# Patient Record
Sex: FEMALE | Race: WHITE | NOT HISPANIC OR LATINO | Employment: OTHER | URBAN - METROPOLITAN AREA
[De-identification: names, ages, dates, MRNs, and addresses within clinical notes are randomized per-mention and may not be internally consistent; named-entity substitution may affect disease eponyms.]

---

## 2017-08-01 ENCOUNTER — APPOINTMENT (OUTPATIENT)
Dept: NEUROLOGY | Facility: MEDICAL CENTER | Age: 67
End: 2017-08-01
Payer: MEDICARE

## 2017-08-01 DIAGNOSIS — G35 MS (MULTIPLE SCLEROSIS) (HCC): ICD-10-CM

## 2017-08-01 RX ORDER — GLATIRAMER ACETATE 40 MG/ML
1 INJECTION, SOLUTION SUBCUTANEOUS
Qty: 12 SYRINGE | Refills: 11 | Status: SHIPPED | OUTPATIENT
Start: 2017-08-02

## 2017-08-01 NOTE — TELEPHONE ENCOUNTER
Was the patient seen in the last year in this department? No     Does patient have an active prescription for medications requested? No     Received Request Via: Patient       Pt and her son called and left  stating that she had court today and could not make it to her appointment. She states she is back on Chemo now but would like to continue her Copaxone again. She states she will make a f/v for your next available appt. But would like to know if you can prescribe this to her.    Please advise    Thanks Batsheva MI

## 2018-01-08 ENCOUNTER — OFFICE VISIT (OUTPATIENT)
Dept: NEUROLOGY | Facility: MEDICAL CENTER | Age: 68
End: 2018-01-08
Payer: MEDICARE

## 2018-01-08 VITALS
BODY MASS INDEX: 15.86 KG/M2 | WEIGHT: 84 LBS | OXYGEN SATURATION: 95 % | SYSTOLIC BLOOD PRESSURE: 142 MMHG | HEIGHT: 61 IN | HEART RATE: 75 BPM | DIASTOLIC BLOOD PRESSURE: 82 MMHG | TEMPERATURE: 98.8 F

## 2018-01-08 DIAGNOSIS — G35 MS (MULTIPLE SCLEROSIS) (HCC): ICD-10-CM

## 2018-01-08 PROCEDURE — 99214 OFFICE O/P EST MOD 30 MIN: CPT | Performed by: PSYCHIATRY & NEUROLOGY

## 2018-01-08 NOTE — ASSESSMENT & PLAN NOTE
Pt has gotten weaker with the chemo. Patient has not had any MS relapses. She does have significant fatigue and some cognitive and memory issues.

## 2018-04-30 ENCOUNTER — OFFICE VISIT (OUTPATIENT)
Dept: NEUROLOGY | Facility: MEDICAL CENTER | Age: 68
End: 2018-04-30
Payer: MEDICARE

## 2018-04-30 VITALS
OXYGEN SATURATION: 91 % | DIASTOLIC BLOOD PRESSURE: 98 MMHG | HEIGHT: 61 IN | WEIGHT: 74.96 LBS | SYSTOLIC BLOOD PRESSURE: 156 MMHG | BODY MASS INDEX: 14.15 KG/M2 | TEMPERATURE: 97.3 F | HEART RATE: 110 BPM

## 2018-04-30 DIAGNOSIS — G35 MS (MULTIPLE SCLEROSIS) (HCC): ICD-10-CM

## 2018-04-30 PROCEDURE — 99214 OFFICE O/P EST MOD 30 MIN: CPT | Performed by: PSYCHIATRY & NEUROLOGY

## 2018-04-30 ASSESSMENT — PATIENT HEALTH QUESTIONNAIRE - PHQ9: CLINICAL INTERPRETATION OF PHQ2 SCORE: 0

## 2018-05-01 NOTE — PROGRESS NOTES
CHIEF COMPLAINT  Chief Complaint   Patient presents with   • Follow-Up     MS       HPI  Nehal Mitcheller is a 65 y.o. female who presents for treatment of her multiple sclerosis with the complicating factor of metastatic breast cancer.  MS (multiple sclerosis)  Patient has stopped the Copaxone as she has lost significant weight from her breast cancer and does not have any places to inject. Patient states that she feels like her walking is getting worse and she has had multiple falls. Her daughter is here with her and states that the walker she has is unstable and she's having difficulty using it.    Malignant neoplasm of left breast  Patient had breast cancer that spread to her internal organs which has been keep at bay with this experimental chemotherapy. She has to go [facility] to receive it every 3 months.    REVIEW OF SYSTEMS  Pertinent Positives: Fatigue, weight loss, gait imbalance   All other systems are negative.     PAST MEDICAL HISTORY  Past Medical History:   Diagnosis Date   • Anxiety    • Cancer (HCC)    • Depression    • Multiple sclerosis (HCC)        SOCIAL HISTORY  Social History     Social History   • Marital status:      Spouse name: N/A   • Number of children: N/A   • Years of education: N/A     Occupational History   • Not on file.     Social History Main Topics   • Smoking status: Former Smoker     Quit date: 3/20/2014   • Smokeless tobacco: Never Used   • Alcohol use No   • Drug use: No      Comment: Notes prior illegal drug use, previously stopped use, denies current use   • Sexual activity: Not on file     Other Topics Concern   • Not on file     Social History Narrative   • No narrative on file       SURGICAL HISTORY  Past Surgical History:   Procedure Laterality Date   • LUMPECTOMY      breast cancer   • OPEN REDUCTION      right elbow       CURRENT MEDICATIONS  Current Outpatient Prescriptions   Medication Sig Dispense Refill   • COPAXONE 40 MG/ML Solution Prefilled Syringe  "Inject 1 Syringe as instructed every Monday, Wednesday, and Friday. 12 Syringe 11   • methadone (DOLOPHINE) 10 MG Tab Take 1 tablet by mouth twice per day as needed for pain. 60 Tab 0   • Oxycodone HCl 20 MG Tab Take 1/2 to 1 tablet by mouth every 6 hours as needed for breakthrough pain. 120 Tab 0   • carisoprodol (SOMA) 350 MG Tab Take 1 Tab by mouth every 8 hours as needed. for muscle spasms. 90 Tab 0   • polyethylene glycol 3350 (MIRALAX) Powder      • nortriptyline (PAMELOR) 25 MG Cap      • duloxetine (CYMBALTA) 60 MG CPEP Take 60 mg by mouth.     • LINZESS 145 MCG CAPS Take 145 mg by mouth.     • omeprazole (PRILOSEC) 20 MG delayed-release capsule Take 20 mg by mouth every day.     • megestrol (MEGACE) 40 MG/ML Suspension      • gabapentin (NEURONTIN) 300 MG Cap Take 300 mg by mouth 3 times a day. Using 2 tablets 3 times per day as needed for nerve pain     • lidocaine-prilocaine (EMLA) 2.5-2.5 % Cream      • vitamin D, Ergocalciferol, (DRISDOL) 16469 UNITS Cap capsule Take 1 Cap by mouth every 7 days. 4 Cap 5     No current facility-administered medications for this visit.        ALLERGIES  Allergies   Allergen Reactions   • Nkda [No Known Drug Allergy]        PHYSICAL EXAM  VITAL SIGNS: /98   Pulse (!) 110   Temp 36.3 °C (97.3 °F)   Ht 1.549 m (5' 1\")   Wt 34 kg (74 lb 15.3 oz)   SpO2 91%   BMI 14.16 kg/m²   Constitutional: Well developed, Well nourished, No acute distress, Non-toxic appearance.   HENT: Normocephalic atraumatic  Eyes: Fundi disc sharp  Neck: Normal range of motion, No tenderness, Supple, No stridor.   Cardiovascular: Normal heart rate, Normal rhythm, No murmurs, No rubs, No gallops.   Thorax & Lungs: Normal breath sounds, No respiratory distress, No wheezing, No chest tenderness.   Abdomen: Bowel sounds normal, Soft, No tenderness, No masses, No pulsatile masses.   Skin: Warm, Dry, No erythema, No rash.   Back: No tenderness, No CVA tenderness.   Extremities: Intact distal " pulses, No edema, No tenderness, No cyanosis, No clubbing.   Neurologic: Alert and oriented to person place and time, cranial nerves II through XII show nystagmus with horizontal gaze, motor exam slightly decreased strength in her right side, DTRs are brisk gait decreased tandem  Psychiatric: Affect normal, Judgment normal, Mood normal.   Lab: Reviewed with patient in detail and as noted in results.        RADIOLOGY/PROCEDURES  MRI of the brain reviewed in the PACS system consistent with multiple sclerosis    ASSESSMENT AND PLAN  1. Malignant neoplasm of left breast  Patient is currently undergoing chemotherapy and an experimental trial at [facility]. She is going tomorrow and will discuss with them the length of time of this treatment.    2. MS (multiple sclerosis)  We will discontinue Copaxone therapy during chemotherapy. Patient was given prescription for her 3 wheeled walker with a seat to help prevent falls.     I spent 35 minutes with this patient and her daughter face-to-face, over fifty percent was spent counseling patient on their condition, best management practices, reviewing test results and risks and benefits of treatment.

## 2018-05-01 NOTE — ASSESSMENT & PLAN NOTE
Patient had breast cancer that spread to her internal organs which has been keep at bay with this experimental chemotherapy. She has to go [facility] to receive it every 3 months.

## 2018-05-01 NOTE — ASSESSMENT & PLAN NOTE
Patient has stopped the Copaxone as she has lost significant weight from her breast cancer and does not have any places to inject. Patient states that she feels like her walking is getting worse and she has had multiple falls. Her daughter is here with her and states that the walker she has is unstable and she's having difficulty using it.

## 2018-11-19 ENCOUNTER — OFFICE VISIT (OUTPATIENT)
Dept: NEUROLOGY | Facility: MEDICAL CENTER | Age: 68
End: 2018-11-19
Payer: MEDICARE

## 2018-11-19 VITALS
HEART RATE: 99 BPM | BODY MASS INDEX: 14.35 KG/M2 | HEIGHT: 61 IN | DIASTOLIC BLOOD PRESSURE: 60 MMHG | TEMPERATURE: 97.8 F | WEIGHT: 76 LBS | OXYGEN SATURATION: 92 % | SYSTOLIC BLOOD PRESSURE: 94 MMHG

## 2018-11-19 DIAGNOSIS — M62.838 MUSCLE SPASM: ICD-10-CM

## 2018-11-19 DIAGNOSIS — G35 MS (MULTIPLE SCLEROSIS) (HCC): ICD-10-CM

## 2018-11-19 DIAGNOSIS — Z71.89 PAIN MEDICATION AGREEMENT DISCUSSED: ICD-10-CM

## 2018-11-19 PROCEDURE — 99214 OFFICE O/P EST MOD 30 MIN: CPT | Performed by: PSYCHIATRY & NEUROLOGY

## 2018-11-19 RX ORDER — CARISOPRODOL 350 MG/1
350 TABLET ORAL NIGHTLY PRN
Qty: 30 TAB | Refills: 5 | Status: SHIPPED | OUTPATIENT
Start: 2018-11-19 | End: 2018-12-19

## 2018-11-20 ENCOUNTER — TELEPHONE (OUTPATIENT)
Dept: NEUROLOGY | Facility: MEDICAL CENTER | Age: 68
End: 2018-11-20

## 2018-11-20 NOTE — ASSESSMENT & PLAN NOTE
Pt feels like her walking and balance got a lot worse. Pt states that she is at risk for falls. Pt needs a walker. Pt wants to go back on the copaxone.

## 2018-11-20 NOTE — TELEPHONE ENCOUNTER
Pt states dr Bloch ordered MRI and will need dr Bloch to change order to state pt is claustrophobic and will need something via IV.

## 2018-11-21 NOTE — TELEPHONE ENCOUNTER
Lets hold on MRI until patient feels better and gains some weight so we do not have to expose her to anesthesia. Thanks MB

## 2018-11-26 NOTE — PROGRESS NOTES
CHIEF COMPLAINT  Chief Complaint   Patient presents with   • Follow-Up     MS       HPI  Nehal Mitcheller is a 65 y.o. female who presents for treatment of her multiple sclerosis with the complicating factor of metastatic breast cancer.  Malignant neoplasm of left breast  Pt finished with chemo and she got a PET which was negative for CA.    MS (multiple sclerosis)  Pt feels like her walking and balance got a lot worse. Pt states that she is at risk for falls. Pt needs a walker. Pt wants to go back on the copaxone.    REVIEW OF SYSTEMS  Pertinent Positives: Fatigue, weight loss, gait imbalance   All other systems are negative.     PAST MEDICAL HISTORY  Past Medical History:   Diagnosis Date   • Anxiety    • Cancer (HCC)    • Depression    • Multiple sclerosis (HCC)        SOCIAL HISTORY  Social History     Social History   • Marital status:      Spouse name: N/A   • Number of children: N/A   • Years of education: N/A     Occupational History   • Not on file.     Social History Main Topics   • Smoking status: Former Smoker     Quit date: 3/20/2014   • Smokeless tobacco: Never Used   • Alcohol use No   • Drug use: No      Comment: Notes prior illegal drug use, previously stopped use, denies current use   • Sexual activity: Not on file     Other Topics Concern   • Not on file     Social History Narrative   • No narrative on file       SURGICAL HISTORY  Past Surgical History:   Procedure Laterality Date   • LUMPECTOMY      breast cancer   • OPEN REDUCTION      right elbow       CURRENT MEDICATIONS  Current Outpatient Prescriptions   Medication Sig Dispense Refill   • carisoprodol (SOMA) 350 MG Tab Take 1 Tab by mouth at bedtime as needed for up to 30 days. for muscle spasms. 30 Tab 5   • COPAXONE 40 MG/ML Solution Prefilled Syringe Inject 1 Syringe as instructed every Monday, Wednesday, and Friday. 12 Syringe 11   • methadone (DOLOPHINE) 10 MG Tab Take 1 tablet by mouth twice per day as needed for pain.  "60 Tab 0   • Oxycodone HCl 20 MG Tab Take 1/2 to 1 tablet by mouth every 6 hours as needed for breakthrough pain. 120 Tab 0   • polyethylene glycol 3350 (MIRALAX) Powder      • duloxetine (CYMBALTA) 60 MG CPEP Take 60 mg by mouth.     • LINZESS 145 MCG CAPS Take 145 mg by mouth.     • omeprazole (PRILOSEC) 20 MG delayed-release capsule Take 20 mg by mouth every day.     • megestrol (MEGACE) 40 MG/ML Suspension      • gabapentin (NEURONTIN) 300 MG Cap Take 300 mg by mouth 3 times a day. Using 2 tablets 3 times per day as needed for nerve pain     • lidocaine-prilocaine (EMLA) 2.5-2.5 % Cream      • vitamin D, Ergocalciferol, (DRISDOL) 77396 UNITS Cap capsule Take 1 Cap by mouth every 7 days. 4 Cap 5   • nortriptyline (PAMELOR) 25 MG Cap        No current facility-administered medications for this visit.        ALLERGIES  Allergies   Allergen Reactions   • Nkda [No Known Drug Allergy]        PHYSICAL EXAM  VITAL SIGNS: BP (!) 94/60 (BP Location: Left arm, Patient Position: Sitting, BP Cuff Size: Adult)   Pulse 99   Temp 36.6 °C (97.8 °F) (Temporal)   Ht 1.549 m (5' 1\")   Wt 34.5 kg (76 lb)   SpO2 92%   BMI 14.36 kg/m²   Constitutional: Well developed, Well nourished, No acute distress, Non-toxic appearance.   HENT: Normocephalic atraumatic  Eyes: Fundi disc sharp  Neck: Normal range of motion, No tenderness, Supple, No stridor.   Cardiovascular: Normal heart rate, Normal rhythm, No murmurs, No rubs, No gallops.   Thorax & Lungs: Normal breath sounds, No respiratory distress, No wheezing, No chest tenderness.   Abdomen: Bowel sounds normal, Soft, No tenderness, No masses, No pulsatile masses.   Skin: Warm, Dry, No erythema, No rash.   Back: No tenderness, No CVA tenderness.   Extremities: Intact distal pulses, No edema, No tenderness, No cyanosis, No clubbing.   Neurologic: Alert and oriented to person place and time, cranial nerves II through XII show nystagmus with horizontal gaze, motor exam slightly decreased " strength in her right side, DTRs are brisk gait decreased tandem  Psychiatric: Affect normal, Judgment normal, Mood normal.   Lab: Reviewed with patient in detail and as noted in results.        RADIOLOGY/PROCEDURES  MRI of the brain reviewed in the PACS system consistent with multiple sclerosis    ASSESSMENT AND PLAN  1. Malignant neoplasm of left breast  Patient is currently undergoing chemotherapy and an experimental trial at [facility]. She is going tomorrow and will discuss with them the length of time of this treatment.    2. MS (multiple sclerosis)  We will discontinue Copaxone therapy during chemotherapy. Patient was given prescription for her 3 wheeled walker with a seat to help prevent falls.     I spent 35 minutes with this patient and her son face-to-face, over fifty percent was spent counseling patient on their condition, best management practices, reviewing test results and risks and benefits of treatment.

## 2019-05-30 DIAGNOSIS — R25.2 SPASTICITY: ICD-10-CM

## 2019-05-31 NOTE — TELEPHONE ENCOUNTER
Was the patient seen in the last year in this department? Yes    Does patient have an active prescription for medications requested? Yes last seen 11/19/18 by dr Bloch, next appt pt was called and advised to schedule with dr Coats for MS    Received Request Via: Pharmacy

## 2019-06-04 RX ORDER — CARISOPRODOL 350 MG/1
TABLET ORAL
Qty: 30 TAB | Refills: 2 | Status: SHIPPED | OUTPATIENT
Start: 2019-06-04 | End: 2019-09-02

## 2020-01-01 ENCOUNTER — OFFICE VISIT (OUTPATIENT)
Dept: NEUROLOGY | Facility: MEDICAL CENTER | Age: 70
End: 2020-01-01
Payer: MEDICARE

## 2020-01-01 VITALS
OXYGEN SATURATION: 96 % | WEIGHT: 80.91 LBS | HEART RATE: 107 BPM | DIASTOLIC BLOOD PRESSURE: 62 MMHG | BODY MASS INDEX: 15.28 KG/M2 | HEIGHT: 61 IN | SYSTOLIC BLOOD PRESSURE: 116 MMHG | TEMPERATURE: 98.5 F

## 2020-01-01 DIAGNOSIS — M62.838 MUSCLE SPASMS OF BOTH LOWER EXTREMITIES: ICD-10-CM

## 2020-01-01 DIAGNOSIS — G35 MULTIPLE SCLEROSIS (HCC): ICD-10-CM

## 2020-01-01 PROCEDURE — 99214 OFFICE O/P EST MOD 30 MIN: CPT | Performed by: PSYCHIATRY & NEUROLOGY

## 2020-01-01 RX ORDER — CARISOPRODOL 350 MG/1
350 TABLET ORAL 2 TIMES DAILY
Qty: 60 TAB | Refills: 5 | Status: SHIPPED | OUTPATIENT
Start: 2020-01-01 | End: 2020-01-01

## 2020-01-01 ASSESSMENT — PATIENT HEALTH QUESTIONNAIRE - PHQ9: CLINICAL INTERPRETATION OF PHQ2 SCORE: 0

## 2020-05-26 NOTE — ASSESSMENT & PLAN NOTE
Here today for c/o spasms to bilateral lower extremities and to bilateral upper extremities.  Would like previously ordered soma for control of spasms.

## 2020-05-27 NOTE — PROGRESS NOTES
"Subjective:      Nehal Teixeira is a 69 y.o. female with a history of MS who presents with c/o of spasms to BLE's and BUE. Previously treated with copaxone but stopped secondary to breast cancer and now receiving last choice of chemotherapy.  Three more infusions and will be completed.  There are no further options for chemo treatments  left as per patient.      HPI:  Nehal Teixeira is here today with complaints of muscle spasms to bother upper and lower extremities. No long on MS therapy but would like some comfort in regards to muscle spasm that are affecting her quality of life.          ROS:   Constitutional: No fevers or chills. Thin, frail in appearance.  Eyes: No blurry vision or eye pain.  ENT: No dysphagia or hearing loss.  Respiratory: No cough or shortness of breath.  Cardiovascular: No chest pain or palpitations.  GI: No nausea, vomiting, or diarrhea.  : No urinary incontinence or dysuria.  Musculoskeletal: No joint swelling or arthralgias. Loses balance more frequently  Skin: No skin rashes.  Left breast area bruised and swollen.  Right chest port visibly seen under the skin to right chest.  Neuro: No headaches, dizziness, or tremors.   Endocrine: No heat or cold intolerance. No polydipsia or polyuria.  Psych:  Appears to be dealing with stage four breast cancer and thinking about hospice.   Heme/Lymph: + bruising to left breast area.             Objective:     /62 (BP Location: Left arm, Patient Position: Sitting, BP Cuff Size: Adult)   Pulse (!) 107   Temp 36.9 °C (98.5 °F) (Temporal)   Ht 1.549 m (5' 1\")   Wt 36.7 kg (80 lb 14.5 oz)   SpO2 96%   BMI 15.29 kg/m²         Neurologic:              Mental Status: Awake, alert, oriented x 3.              Speech: Fluent with normal prosody.              Memory: Able to recall recent and remote events accurately.               Concentration: Attentive. Able to focus on history and follow multi-step commands.              " Fund of Knowledge: Appropriate.              Cranial Nerves:                          CN II: PERRL. No afferent pupillary defect.                          CN III, IV, VI: Good eye closure, EOMI.                           CN V: Facial sensation intact and symmetric.                           CN VII: No facial asymmetry.                           CN VIII: Hearing intact.                           CN IX and X: Palate elevates symmetrically. Normal gag reflex.                          CN XI: Symmetric shoulder shrug.                           CN XII: Tongue midline.                          Coordination: Unsteady when getting up from chair              DTR's:  2 throughout              Babinski: Refused              Romberg: Refused               Movements: No tremors observed.   Musculoskeletal:               Strength: 3/5 in upper and lower extremities bilaterally.              Gait: Unsteady and guarded               Tone: Generalized weakness              Joints: No swelling.                  Assessment/Plan:       1. Muscle spasms of both lower extremities    - carisoprodol (SOMA) 350 MG Tab; Take 1 Tab by mouth 2 times a day for 30 days.  Dispense: 60 Tab; Refill: 5    2. MS (multiple sclerosis) (HCC)        No therapy at this time as patient is receiving chemotherapy.  If in the future decides she's like to resume will revisit that.  For now will keep comfortable with preferred muscle relaxer that has helped in the past.    Patient will call prn.    I Ashlyn He NP and scribing and in the presence of Dr. Melissa Bloch    BILLING  Dr Bloch spent minutes with this patient counseling and teaching regarding meds and disease process    Ashlyn He NP-C    I, Dr. Bloch personally performed the services described in this documentation, as scribed by Ashlyn He in my presence, and it is both accurate and complete. I spent  35 minutes with this patient face-to-face, over fifty percent was spent counseling  patient on their condition, best management practices, reviewing test results and risks and benefits of treatment.

## 2023-09-19 NOTE — PROGRESS NOTES
CHIEF COMPLAINT  Chief Complaint   Patient presents with   • Follow-Up     MS       HPI  Nehal Teixeira is a 65 y.o. female who presents for treatment of her multiple sclerosis with new complicating factor of breast cancer.  MS (multiple sclerosis)  Pt has gotten weaker with the chemo. Patient has not had any MS relapses. She does have significant fatigue and some cognitive and memory issues.    Malignant neoplasm of left breast  Patient is getting chemotherapy through a trial [facility].    REVIEW OF SYSTEMS  Pertinent Positives: Fatigue, weight loss, gait imbalance   All other systems are negative.     PAST MEDICAL HISTORY  Past Medical History:   asdfasdfads Date   • Anxiety    • Cancer (CMS-HCC)    • Depression    • Multiple sclerosis (CMS-HCC)        SOCIAL HISTORY  Social History     Social History   • Marital status:      Spouse name: N/A   • Number of children: N/A   • Years of education: N/A     Occupational History   • Not on file.     Social History Main Topics   • Smoking status: Former Smoker     Quit date: 3/20/2014   • Smokeless tobacco: Never Used   • Alcohol use No   • Drug use: No      Comment: Notes prior illegal drug use, previously stopped use, denies current use   • Sexual activity: Not on file     Other Topics Concern   • Not on file     Social History Narrative   • No narrative on file       SURGICAL HISTORY  Past Surgical History:   Procedure Laterality Date   • LUMPECTOMY      breast cancer   • OPEN REDUCTION      right elbow       CURRENT MEDICATIONS  Current Outpatient Prescriptions   Medication Sig Dispense Refill   • COPAXONE 40 MG/ML Solution Prefilled Syringe Inject 1 Syringe as instructed every Monday, Wednesday, and Friday. 12 Syringe 11   • methadone (DOLOPHINE) 10 MG Tab Take 1 tablet by mouth twice per day as needed for pain. 60 Tab 0   • Oxycodone HCl 20 MG Tab Take 1/2 to 1 tablet by mouth every 6 hours as needed for breakthrough pain. 120 Tab 0   • carisoprodol  "(SOMA) 350 MG Tab Take 1 Tab by mouth every 8 hours as needed. for muscle spasms. 90 Tab 0   • megestrol (MEGACE) 40 MG/ML Suspension      • polyethylene glycol 3350 (MIRALAX) Powder      • nortriptyline (PAMELOR) 25 MG Cap      • duloxetine (CYMBALTA) 60 MG CPEP Take 60 mg by mouth.     • LINZESS 145 MCG CAPS Take 145 mg by mouth.     • omeprazole (PRILOSEC) 20 MG delayed-release capsule Take 20 mg by mouth every day.     • gabapentin (NEURONTIN) 300 MG Cap Take 300 mg by mouth 3 times a day. Using 2 tablets 3 times per day as needed for nerve pain     • lidocaine-prilocaine (EMLA) 2.5-2.5 % Cream      • vitamin D, Ergocalciferol, (DRISDOL) 47288 UNITS Cap capsule Take 1 Cap by mouth every 7 days. 4 Cap 5     No current facility-administered medications for this visit.        ALLERGIES  Allergies   Allergen Reactions   • Nkda [No Known Drug Allergy]        PHYSICAL EXAM  VITAL SIGNS: /82   Pulse 75   Temp 37.1 °C (98.8 °F)   Ht 1.549 m (5' 1\")   Wt 38.1 kg (84 lb)   SpO2 95%   BMI 15.87 kg/m²   Constitutional: Well developed, Well nourished, No acute distress, Non-toxic appearance.   HENT: Normocephalic atraumatic  Eyes: Fundi disc sharp  Neck: Normal range of motion, No tenderness, Supple, No stridor.   Cardiovascular: Normal heart rate, Normal rhythm, No murmurs, No rubs, No gallops.   Thorax & Lungs: Normal breath sounds, No respiratory distress, No wheezing, No chest tenderness.   Abdomen: Bowel sounds normal, Soft, No tenderness, No masses, No pulsatile masses.   Skin: Warm, Dry, No erythema, No rash.   Back: No tenderness, No CVA tenderness.   Extremities: Intact distal pulses, No edema, No tenderness, No cyanosis, No clubbing.   Neurologic: Alert and oriented to person place and time, cranial nerves II through XII show nystagmus with horizontal gaze, motor exam slightly decreased strength in her right side, DTRs are brisk gait decreased tandem  Psychiatric: Affect normal, Judgment normal, Mood " VISUAL CHANGES normal.   Lab: Reviewed with patient in detail and as noted in results.        RADIOLOGY/PROCEDURES  MRI of the brain reviewed in the PACS system consistent with multiple sclerosis    ASSESSMENT AND PLAN  1. Malignant neoplasm of left breast  Patient is currently undergoing chemotherapy    2. MS (multiple sclerosis)  We will hold on Copaxone therapy during chemotherapy.     I spent 25 minutes with this patient and her daughter, over fifty percent was spent counseling patient on their condition, best management practices, reviewing test results and risks and benefits of treatment.